# Patient Record
Sex: FEMALE | Race: WHITE | NOT HISPANIC OR LATINO | Employment: UNEMPLOYED | URBAN - METROPOLITAN AREA
[De-identification: names, ages, dates, MRNs, and addresses within clinical notes are randomized per-mention and may not be internally consistent; named-entity substitution may affect disease eponyms.]

---

## 2019-05-28 PROBLEM — N32.81 OAB (OVERACTIVE BLADDER): Status: ACTIVE | Noted: 2019-05-28

## 2019-07-13 PROBLEM — K21.9 GASTROESOPHAGEAL REFLUX DISEASE WITHOUT ESOPHAGITIS: Status: ACTIVE | Noted: 2017-05-11

## 2019-07-13 PROBLEM — F33.1 MODERATE EPISODE OF RECURRENT MAJOR DEPRESSIVE DISORDER: Status: ACTIVE | Noted: 2019-07-13

## 2019-10-25 PROBLEM — E11.9 TYPE 2 DIABETES MELLITUS WITHOUT COMPLICATION, WITHOUT LONG-TERM CURRENT USE OF INSULIN: Status: ACTIVE | Noted: 2019-10-25

## 2019-11-21 PROBLEM — M79.18 MUSCULOSKELETAL PAIN: Status: ACTIVE | Noted: 2019-11-21

## 2019-11-21 PROBLEM — J30.89 ENVIRONMENTAL AND SEASONAL ALLERGIES: Status: ACTIVE | Noted: 2019-11-21

## 2019-11-21 PROBLEM — Z79.4 TYPE 2 DIABETES MELLITUS, WITH LONG-TERM CURRENT USE OF INSULIN: Status: ACTIVE | Noted: 2019-11-21

## 2019-11-21 PROBLEM — E11.9 TYPE 2 DIABETES MELLITUS, WITH LONG-TERM CURRENT USE OF INSULIN: Status: ACTIVE | Noted: 2019-11-21

## 2019-11-21 PROBLEM — G62.9 NEUROPATHY: Status: ACTIVE | Noted: 2019-11-21

## 2019-11-26 PROBLEM — N39.46 MIXED INCONTINENCE: Status: ACTIVE | Noted: 2019-11-26

## 2019-11-26 PROBLEM — R35.0 FREQUENCY OF MICTURITION: Status: ACTIVE | Noted: 2019-11-26

## 2019-11-26 PROBLEM — R35.1 NOCTURIA: Status: ACTIVE | Noted: 2019-11-26

## 2019-11-26 PROBLEM — R39.15 URGENCY OF URINATION: Status: ACTIVE | Noted: 2019-11-26

## 2019-11-26 PROBLEM — N39.41 URGENCY INCONTINENCE: Status: RESOLVED | Noted: 2019-11-26 | Resolved: 2019-11-26

## 2019-11-26 PROBLEM — N39.41 URGENCY INCONTINENCE: Status: ACTIVE | Noted: 2019-11-26

## 2019-11-27 PROBLEM — E66.01 CLASS 2 SEVERE OBESITY WITH SERIOUS COMORBIDITY AND BODY MASS INDEX (BMI) OF 38.0 TO 38.9 IN ADULT: Status: ACTIVE | Noted: 2019-11-27

## 2019-11-27 PROBLEM — E66.812 CLASS 2 SEVERE OBESITY WITH SERIOUS COMORBIDITY AND BODY MASS INDEX (BMI) OF 38.0 TO 38.9 IN ADULT: Status: ACTIVE | Noted: 2019-11-27

## 2021-04-17 ENCOUNTER — HOSPITAL ENCOUNTER (EMERGENCY)
Facility: HOSPITAL | Age: 36
Discharge: HOME | End: 2021-04-17
Attending: EMERGENCY MEDICINE

## 2021-04-17 VITALS
WEIGHT: 235.88 LBS | HEART RATE: 87 BPM | SYSTOLIC BLOOD PRESSURE: 185 MMHG | BODY MASS INDEX: 36.95 KG/M2 | RESPIRATION RATE: 16 BRPM | TEMPERATURE: 100 F | OXYGEN SATURATION: 98 % | DIASTOLIC BLOOD PRESSURE: 97 MMHG

## 2021-04-17 DIAGNOSIS — R30.0 DYSURIA: Primary | ICD-10-CM

## 2021-04-17 DIAGNOSIS — B37.49 CANDIDIASIS, UROGENITAL: ICD-10-CM

## 2021-04-17 DIAGNOSIS — E11.9 TYPE 2 DIABETES MELLITUS WITHOUT COMPLICATION, WITHOUT LONG-TERM CURRENT USE OF INSULIN: ICD-10-CM

## 2021-04-17 LAB
B-HCG UR QL: NEGATIVE
BACTERIA #/AREA URNS AUTO: ABNORMAL /HPF
BILIRUB UR QL STRIP: NEGATIVE
CLARITY UR REFRACT.AUTO: CLEAR
COLOR UR AUTO: YELLOW
GLUCOSE UR QL STRIP: ABNORMAL
HGB UR QL STRIP: NEGATIVE
KETONES UR QL STRIP: NEGATIVE
LEUKOCYTE ESTERASE UR QL STRIP: NEGATIVE
MICROSCOPIC COMMENT: ABNORMAL
NITRITE UR QL STRIP: NEGATIVE
PH UR STRIP: 6 [PH] (ref 5–8)
PROT UR QL STRIP: NEGATIVE
SP GR UR STRIP: <=1.005 (ref 1–1.03)
SQUAMOUS #/AREA URNS AUTO: 2 /HPF
URN SPEC COLLECT METH UR: ABNORMAL
UROBILINOGEN UR STRIP-ACNC: NEGATIVE EU/DL
YEAST UR QL AUTO: ABNORMAL

## 2021-04-17 PROCEDURE — 81000 URINALYSIS NONAUTO W/SCOPE: CPT | Mod: ER | Performed by: EMERGENCY MEDICINE

## 2021-04-17 PROCEDURE — 81025 URINE PREGNANCY TEST: CPT | Mod: ER | Performed by: EMERGENCY MEDICINE

## 2021-04-17 PROCEDURE — 99284 EMERGENCY DEPT VISIT MOD MDM: CPT | Mod: ER

## 2021-04-17 RX ORDER — FLUCONAZOLE 150 MG/1
150 TABLET ORAL DAILY
Qty: 2 TABLET | Refills: 1 | Status: SHIPPED | OUTPATIENT
Start: 2021-04-17 | End: 2021-10-08 | Stop reason: ALTCHOICE

## 2021-04-17 RX ORDER — PHENAZOPYRIDINE HYDROCHLORIDE 200 MG/1
200 TABLET, FILM COATED ORAL 3 TIMES DAILY
Qty: 6 TABLET | Refills: 0 | Status: SHIPPED | OUTPATIENT
Start: 2021-04-17 | End: 2021-04-27

## 2021-04-21 DIAGNOSIS — Z11.59 NEED FOR HEPATITIS C SCREENING TEST: ICD-10-CM

## 2021-04-21 DIAGNOSIS — E11.9 TYPE 2 DIABETES MELLITUS WITHOUT COMPLICATION: ICD-10-CM

## 2022-03-04 PROBLEM — E27.8 ADRENAL MASS: Status: ACTIVE | Noted: 2022-03-04

## 2023-10-27 ENCOUNTER — PATIENT MESSAGE (OUTPATIENT)
Dept: ADMINISTRATIVE | Facility: HOSPITAL | Age: 38
End: 2023-10-27
Payer: MEDICAID

## 2023-11-06 ENCOUNTER — CLINICAL SUPPORT (OUTPATIENT)
Dept: SMOKING CESSATION | Facility: CLINIC | Age: 38
End: 2023-11-06
Payer: COMMERCIAL

## 2023-11-06 DIAGNOSIS — F17.200 SMOKER: ICD-10-CM

## 2023-11-06 PROCEDURE — 99404 PREV MED CNSL INDIV APPRX 60: CPT | Mod: S$GLB,,,

## 2023-11-06 PROCEDURE — 99404 PR PREVENT COUNSEL,INDIV,60 MIN: ICD-10-PCS | Mod: S$GLB,,,

## 2023-11-06 RX ORDER — DM/P-EPHED/ACETAMINOPH/DOXYLAM 30-7.5/3
2 LIQUID (ML) ORAL
Qty: 168 LOZENGE | Refills: 0 | Status: SHIPPED | OUTPATIENT
Start: 2023-11-06 | End: 2023-12-05

## 2023-11-06 RX ORDER — IBUPROFEN 200 MG
1 TABLET ORAL DAILY
Qty: 14 PATCH | Refills: 0 | Status: SHIPPED | OUTPATIENT
Start: 2023-11-06 | End: 2023-11-20 | Stop reason: SDUPTHER

## 2023-11-06 NOTE — PROGRESS NOTES
Pt smokes 3 cpd and vapes over 100x per day.  She smokes and vapes in her home and car.  Mom is a smoker. She has quit other addictions and wants to quit for her health.  She will use 21mg patches and 2 mg zack.

## 2023-11-13 ENCOUNTER — PATIENT MESSAGE (OUTPATIENT)
Dept: ADMINISTRATIVE | Facility: HOSPITAL | Age: 38
End: 2023-11-13
Payer: MEDICAID

## 2023-11-13 ENCOUNTER — PATIENT OUTREACH (OUTPATIENT)
Dept: ADMINISTRATIVE | Facility: HOSPITAL | Age: 38
End: 2023-11-13
Payer: MEDICAID

## 2023-11-19 PROBLEM — H52.7 REFRACTIVE ERROR: Status: ACTIVE | Noted: 2023-11-19

## 2023-11-19 PROBLEM — H35.033 HYPERTENSIVE RETINOPATHY OF BOTH EYES: Status: ACTIVE | Noted: 2023-11-19

## 2023-11-20 ENCOUNTER — CLINICAL SUPPORT (OUTPATIENT)
Dept: SMOKING CESSATION | Facility: CLINIC | Age: 38
End: 2023-11-20

## 2023-11-20 DIAGNOSIS — F17.200 NICOTINE DEPENDENCE: Primary | ICD-10-CM

## 2023-11-20 DIAGNOSIS — F17.200 SMOKER: ICD-10-CM

## 2023-11-20 PROCEDURE — 99403 PR PREVENT COUNSEL,INDIV,45 MIN: ICD-10-PCS | Mod: S$GLB,,,

## 2023-11-20 PROCEDURE — 99403 PREV MED CNSL INDIV APPRX 45: CPT | Mod: S$GLB,,,

## 2023-11-20 RX ORDER — IBUPROFEN 200 MG
1 TABLET ORAL DAILY
Qty: 14 PATCH | Refills: 0 | Status: SHIPPED | OUTPATIENT
Start: 2023-11-20 | End: 2023-11-29 | Stop reason: SDUPTHER

## 2023-11-20 RX ORDER — MICONAZOLE NITRATE 2 %
2 CREAM (GRAM) TOPICAL
Qty: 190 EACH | Refills: 0 | Status: SHIPPED | OUTPATIENT
Start: 2023-11-20

## 2023-11-20 NOTE — PROGRESS NOTES
Individual Follow-Up Form    11/20/2023    Quit Date: TBD    Clinical Status of Patient: Outpatient    Length of Service: 45 minutes    Continuing Medication: yes  Patches, Nicotine gum, or Nicotine Lozenges    Other Medications: none     Target Symptoms: Withdrawal and medication side effects. The following were  rated moderate (3) to severe (4) on TCRS:  Moderate (3): lozenges; pt macarena not like the taste or texture and declines to use them.  Severe (4): none    Comments: Pt is smoking much less on vape and has quit cigarettes.  She is using patches and no longer likes the lozenges, and has asked to use the nicotine gum instead. She is happy with progress and is hopeful about being quit soon.  She is now walking around outside of her home to help with cravings.  The patient will continue with  therapy sessions and medication monitoring by CTTS. Prescribed medication management will be by physician.  completion of TCRS (Tobacco Cessation Rating Scale) reviewed strategies, cues, and triggers. Introduced the negative impact of tobacco on health, the health advantages of discontinuing the use of tobacco, time line improved health changes after a quit, withdrawal issues to expect from nicotine and habit, and ways to achieve the goal of a quit.  The patient denies any abnormal behavioral or mental changes at this time.       Diagnosis: F17.200    Next Visit: 2 weeks

## 2023-11-29 DIAGNOSIS — F17.200 SMOKER: ICD-10-CM

## 2023-11-29 RX ORDER — IBUPROFEN 200 MG
1 TABLET ORAL DAILY
Qty: 14 PATCH | Refills: 0 | Status: SHIPPED | OUTPATIENT
Start: 2023-11-29 | End: 2023-12-21

## 2023-12-08 ENCOUNTER — CLINICAL SUPPORT (OUTPATIENT)
Dept: SMOKING CESSATION | Facility: CLINIC | Age: 38
End: 2023-12-08

## 2023-12-08 DIAGNOSIS — F17.200 NICOTINE DEPENDENCE: Primary | ICD-10-CM

## 2023-12-08 PROCEDURE — 99402 PREV MED CNSL INDIV APPRX 30: CPT | Mod: S$GLB,,,

## 2023-12-08 PROCEDURE — 99402 PR PREVENT COUNSEL,INDIV,30 MIN: ICD-10-PCS | Mod: S$GLB,,,

## 2023-12-08 RX ORDER — DIPHENHYDRAMINE HCL 25 MG
4 CAPSULE ORAL
Qty: 190 EACH | Refills: 0 | Status: SHIPPED | OUTPATIENT
Start: 2023-12-08

## 2023-12-08 RX ORDER — IBUPROFEN 200 MG
1 TABLET ORAL DAILY
Qty: 14 PATCH | Refills: 0 | Status: SHIPPED | OUTPATIENT
Start: 2023-12-08 | End: 2023-12-11 | Stop reason: SDUPTHER

## 2023-12-08 NOTE — PROGRESS NOTES
Individual Follow-Up Form    12/8/2023    Quit Date: TBD    Clinical Status of Patient: Outpatient    Length of Service: 30 minutes    Continuing Medication: yes  Patches or Nicotine gum    Other Medications: none     Target Symptoms: Withdrawal and medication side effects. The following were  rated moderate (3) to severe (4) on TCRS:  Moderate (3): none  Severe (4): none    Comments: Pt is vaping a few times per day.  She has detached more this past week from her usual vaping.  She is ready to move down in patch mg to 14 mg and she is using the nicotine gum regularly to help with cravings.   The patient will continue with  therapy sessions and medication monitoring by CTTS. Prescribed medication management will be by physician.  completion of TCRS (Tobacco Cessation Rating Scale) reviewed strategies, controlling environment, cues, triggers, new goals set. Introduced high risk situations with preparation interventions, caffeine similarities with withdrawal issues of habit and nicotine, Alcohol, Understanding urges, cravings, stress and relaxation. Open discussion with intervention discussion.  The patient denies any abnormal behavioral or mental changes at this time.        Diagnosis: F17.200    Next Visit: 2 weeks

## 2023-12-11 DIAGNOSIS — F17.200 NICOTINE DEPENDENCE: ICD-10-CM

## 2023-12-11 RX ORDER — IBUPROFEN 200 MG
1 TABLET ORAL DAILY
Qty: 14 PATCH | Refills: 0 | Status: SHIPPED | OUTPATIENT
Start: 2023-12-11 | End: 2023-12-21

## 2023-12-15 PROBLEM — F41.9 ANXIETY: Status: ACTIVE | Noted: 2023-12-15

## 2023-12-15 PROBLEM — F43.10 PTSD (POST-TRAUMATIC STRESS DISORDER): Status: ACTIVE | Noted: 2023-12-15

## 2023-12-15 PROBLEM — F15.21 METHAMPHETAMINE USE DISORDER, MODERATE, IN EARLY REMISSION: Status: ACTIVE | Noted: 2023-12-15

## 2023-12-20 ENCOUNTER — TELEPHONE (OUTPATIENT)
Dept: SMOKING CESSATION | Facility: CLINIC | Age: 38
End: 2023-12-20
Payer: MEDICAID

## 2023-12-29 PROBLEM — S90.821A BLISTER OF RIGHT FOOT: Status: ACTIVE | Noted: 2023-12-29

## 2024-01-02 ENCOUNTER — CLINICAL SUPPORT (OUTPATIENT)
Dept: SMOKING CESSATION | Facility: CLINIC | Age: 39
End: 2024-01-02

## 2024-01-02 DIAGNOSIS — F17.200 NICOTINE DEPENDENCE: ICD-10-CM

## 2024-01-02 RX ORDER — IBUPROFEN 200 MG
1 TABLET ORAL DAILY
Qty: 14 PATCH | Refills: 0 | Status: SHIPPED | OUTPATIENT
Start: 2024-01-02

## 2024-01-02 NOTE — PROGRESS NOTES
Individual Follow-Up Form    1/2/2024    Quit Date: TBD    Clinical Status of Patient: Outpatient    Length of Service: 30 minutes    Continuing Medication: yes  Patches    Other Medications: none     Target Symptoms: Withdrawal and medication side effects. The following were  rated moderate (3) to severe (4) on TCRS:  Moderate (3): none  Severe (4): none    Comments: Pt is not smoking as much due to have all of her teeth pulled for dentures.  She was advised to stop smoking, but has continued to puff on a few cigarettes here and there. I advised her on this health opportunity to completely quit smoking and she agreed and stated that she is trying to let them go. She will continue to use 14mg patches.  The patient will continue with  therapy sessions and medication monitoring by CTTS. Prescribed medication management will be by physician.  completion of TCRS (Tobacco Cessation Rating Scale) reviewed strategies, cues, triggers, high risk situations, lapses, relapses, diet, exercise, stress, relaxation, sleep, habitual behavior, and life style changes.  The patient denies any abnormal behavioral or mental changes at this time.       Diagnosis: F17.200    Next Visit: 2 weeks

## 2024-01-08 ENCOUNTER — PATIENT MESSAGE (OUTPATIENT)
Dept: ADMINISTRATIVE | Facility: HOSPITAL | Age: 39
End: 2024-01-08
Payer: MEDICAID

## 2024-01-31 ENCOUNTER — PATIENT OUTREACH (OUTPATIENT)
Dept: ADMINISTRATIVE | Facility: HOSPITAL | Age: 39
End: 2024-01-31
Payer: MEDICAID

## 2024-01-31 ENCOUNTER — PATIENT MESSAGE (OUTPATIENT)
Dept: ADMINISTRATIVE | Facility: HOSPITAL | Age: 39
End: 2024-01-31
Payer: MEDICAID

## 2024-01-31 DIAGNOSIS — E11.9 DIABETES MELLITUS TYPE 2 WITHOUT RETINOPATHY: Primary | ICD-10-CM

## 2024-02-05 ENCOUNTER — TELEPHONE (OUTPATIENT)
Dept: SMOKING CESSATION | Facility: CLINIC | Age: 39
End: 2024-02-05
Payer: MEDICAID

## 2024-04-03 ENCOUNTER — PATIENT MESSAGE (OUTPATIENT)
Dept: PULMONOLOGY | Facility: CLINIC | Age: 39
End: 2024-04-03
Payer: MEDICAID

## 2024-05-06 ENCOUNTER — TELEPHONE (OUTPATIENT)
Dept: SMOKING CESSATION | Facility: CLINIC | Age: 39
End: 2024-05-06
Payer: MEDICAID
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
## 2024-05-25 PROBLEM — S06.5XAA SUBDURAL HEMATOMA: Status: ACTIVE | Noted: 2024-05-25

## 2024-05-25 PROBLEM — S12.9XXA: Status: ACTIVE | Noted: 2024-05-25

## 2024-05-27 PROBLEM — L97.509 FOOT ULCER DUE TO SECONDARY DM: Status: ACTIVE | Noted: 2024-05-27

## 2024-05-27 PROBLEM — I10 PRIMARY HYPERTENSION: Status: ACTIVE | Noted: 2024-05-27

## 2024-05-27 PROBLEM — F10.939 ALCOHOL WITHDRAWAL SYNDROME WITH COMPLICATION: Status: ACTIVE | Noted: 2024-05-27

## 2024-05-27 PROBLEM — E11.621 TYPE 2 DIABETES MELLITUS WITH FOOT ULCER: Status: ACTIVE | Noted: 2024-05-27

## 2024-05-27 PROBLEM — L97.509 TYPE 2 DIABETES MELLITUS WITH FOOT ULCER: Status: ACTIVE | Noted: 2024-05-27

## 2024-05-27 PROBLEM — E13.621 FOOT ULCER DUE TO SECONDARY DM: Status: ACTIVE | Noted: 2024-05-27

## 2024-05-27 PROBLEM — F19.939 DRUG WITHDRAWAL: Status: ACTIVE | Noted: 2024-05-27
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
## 2024-06-02 PROBLEM — R31.9 URINARY TRACT INFECTION WITH HEMATURIA: Status: ACTIVE | Noted: 2024-06-02

## 2024-06-02 PROBLEM — N39.0 URINARY TRACT INFECTION WITH HEMATURIA: Status: ACTIVE | Noted: 2024-06-02

## 2024-09-02 PROBLEM — R31.9 URINARY TRACT INFECTION WITH HEMATURIA: Status: RESOLVED | Noted: 2024-06-02 | Resolved: 2024-09-02

## 2024-09-02 PROBLEM — N39.0 URINARY TRACT INFECTION WITH HEMATURIA: Status: RESOLVED | Noted: 2024-06-02 | Resolved: 2024-09-02

## 2024-09-23 ENCOUNTER — PATIENT OUTREACH (OUTPATIENT)
Dept: ADMINISTRATIVE | Facility: HOSPITAL | Age: 39
End: 2024-09-23
Payer: COMMERCIAL

## 2024-09-23 DIAGNOSIS — E11.9 DIABETES MELLITUS TYPE 2 WITHOUT RETINOPATHY: Primary | ICD-10-CM

## 2024-11-13 ENCOUNTER — TELEPHONE (OUTPATIENT)
Dept: SMOKING CESSATION | Facility: CLINIC | Age: 39
End: 2024-11-13
Payer: COMMERCIAL

## 2024-11-13 NOTE — TELEPHONE ENCOUNTER
1st attempt, regarding smoking cessation 12 month follow up for quit 1 episode. Voicemail box not set up; unable to leave a message.

## 2024-11-20 ENCOUNTER — TELEPHONE (OUTPATIENT)
Dept: SMOKING CESSATION | Facility: CLINIC | Age: 39
End: 2024-11-20
Payer: COMMERCIAL

## 2024-11-20 NOTE — TELEPHONE ENCOUNTER
2nd attempt, regarding smoking cessation 12 month follow up on quit 1 episode. Voicemail box not set up. Unable to leave a message or reach patient. Will resolve episode.

## 2025-01-09 ENCOUNTER — ON-DEMAND VIRTUAL (OUTPATIENT)
Dept: URGENT CARE | Facility: CLINIC | Age: 40
End: 2025-01-09
Payer: MEDICAID

## 2025-01-09 DIAGNOSIS — G62.9 NEUROPATHY: Primary | ICD-10-CM

## 2025-01-09 NOTE — PROGRESS NOTES
Subjective:      Patient ID: Amber Kay Barran-Sturgeon is a 39 y.o. female.    Vitals:  vitals were not taken for this visit.     Chief Complaint: No chief complaint on file.      Visit Type: TELE AUDIOVISUAL    Present with the patient at the time of consultation: TELEMED PRESENT WITH PATIENT: colleague/friend in vehicle.     Past Medical History:   Diagnosis Date    Anxiety     Depression     Diabetes mellitus     Diabetes mellitus, type 2     Frequency of micturition 2019    GERD (gastroesophageal reflux disease)     Hyperlipidemia     Hypertension     Insomnia     Methamphetamine use disorder, moderate, in early remission 12/15/2023    Nocturia 2019    Sleep apnea     Stress incontinence     Urgency incontinence 2019    Urgency of urination 2019     Past Surgical History:   Procedure Laterality Date    BIOPSY OF AXILLARY NODE Left 3/4/2022    Procedure: BIOPSY, LYMPH NODE, AXILLARY;  Surgeon: Walt Truong MD;  Location: [facility] OR;  Service: General;  Laterality: Left;    elective       LIVER BIOPSY      ROBOT-ASSISTED SURGICAL REMOVAL OF ADRENAL GLAND USING DA CHAR XI Left 3/14/2022    Procedure: XI ROBOTIC ADRENALECTOMY;  Surgeon: Walt Truong MD;  Location: [facility] OR;  Service: General;  Laterality: Left;    WISDOM TOOTH EXTRACTION       Review of patient's allergies indicates:   Allergen Reactions    Propoxyphene n-acetaminophen Itching    Vancomycin analogues Swelling and Blisters    Codeine     Hydrocodone     Hydrocodone bitartrate      Current Outpatient Medications on File Prior to Visit   Medication Sig Dispense Refill    ARIPiprazole (ABILIFY) 5 MG Tab Take 1 tablet by mouth once daily 30 tablet 5    atorvastatin (LIPITOR) 20 MG tablet Take 1 tablet by mouth once daily 30 tablet 5    blood sugar diagnostic (TRUE METRIX GLUCOSE TEST STRIP) Strp Use one daily 100 strip 3    blood-glucose meter kit Use as instructed 1 each 0    carvediloL (COREG) 12.5  MG tablet Take 1 tablet (12.5 mg total) by mouth 2 (two) times daily with meals. 60 tablet 5    cetirizine (ZYRTEC) 10 MG tablet Take 1 tablet (10 mg total) by mouth once daily. 30 tablet 5    chlorhexidine (PERIDEX) 0.12 % solution swish and spit 15 mls BY MOUTH ONCE DAILY AS DIRECTED      cyclobenzaprine (FLEXERIL) 5 MG tablet Take 5 mg by mouth 3 (three) times daily as needed.      dicyclomine (BENTYL) 10 MG capsule Take 1 capsule (10 mg total) by mouth 3 (three) times daily. 90 capsule 0    DULoxetine (CYMBALTA) 60 MG capsule Take 1 capsule (60 mg total) by mouth 2 (two) times daily. 60 capsule 1    ferrous gluconate 324 mg (37.5 mg iron) Tab tablet Take 1 tablet (324 mg total) by mouth daily with breakfast. 90 tablet 3    fluticasone propionate (FLONASE) 50 mcg/actuation nasal spray 1 spray by Each Nostril route.      glimepiride (AMARYL) 4 MG tablet TAKE 1 TABLET BY MOUTH BEFORE BREAKFAST 30 tablet 5    glipiZIDE (GLUCOTROL) 10 MG tablet Take 1 tablet (10 mg total) by mouth 2 (two) times daily before meals. 30 tablet 5    ibuprofen (ADVIL,MOTRIN) 600 MG tablet Take 1 tablet (600 mg total) by mouth 2 (two) times a day. 60 tablet 5    insulin aspart U-100 (NOVOLOG) 100 unit/mL (3 mL) InPn pen Inject 12 Units into the skin 3 (three) times daily with meals. 9 mL 5    insulin glargine U-100, Lantus, (LANTUS SOLOSTAR U-100 INSULIN) 100 unit/mL (3 mL) InPn pen Inject 80 Units into the skin once daily. 24 mL 5    levETIRAcetam (KEPPRA) 1000 MG tablet Take 1 tablet (1,000 mg total) by mouth 2 (two) times daily. 60 tablet 5    lisinopriL 10 MG tablet Take 1 tablet (10 mg total) by mouth once daily. 30 tablet 5    metFORMIN (GLUCOPHAGE-XR) 500 MG ER 24hr tablet Take 2 tablets (1,000 mg total) by mouth 2 (two) times daily with meals. 120 tablet 5    mupirocin (BACTROBAN) 2 % ointment SMARTSIG:Sparingly Topical Daily      nicotine (NICODERM CQ) 14 mg/24 hr Place 1 patch onto the skin once daily. 14 patch 0    nicotine  "polacrilex (NICORETTE) 4 MG Gum Take 1 each (4 mg total) by mouth as needed (May use up to 6 pieces in 24 hours). 190 each 0    nicotine, polacrilex, (NICORETTE) 2 mg Gum Take 1 each (2 mg total) by mouth as needed (May use up to 8 pieces of gum in 24 horus). 190 each 0    NIFEdipine (PROCARDIA-XL) 90 MG (OSM) 24 hr tablet Take 1 tablet (90 mg total) by mouth once daily. 30 tablet 11    norethindrone (MICRONOR) 0.35 mg tablet Take 1 tablet (0.35 mg total) by mouth once daily. 28 tablet 11    omeprazole (PRILOSEC) 20 MG capsule Take 1 capsule (20 mg total) by mouth 2 (two) times daily as needed (gerd). Take 1 capsule (20 mg total) by mouth daily 60 capsule 5    oxybutynin (DITROPAN) 5 MG Tab Take 5 mg by mouth 3 (three) times daily.      pen needle, diabetic (PEN NEEDLE) 31 gauge x 1/4" Ndle 4 injection a day 120 each 5    pen needle, diabetic 31 gauge x 3/16" Ndle Use 1 daily 100 each 3    pen needle, diabetic 31 gauge x 5/16" Ndle USE 1 NEEDLE ONCE DAILY      pregabalin (LYRICA) 300 MG Cap Take 1 capsule (300 mg total) by mouth 3 (three) times daily. 90 capsule 5    QUEtiapine (SEROQUEL) 50 MG tablet Take 1 tablet (50 mg total) by mouth every evening. 30 tablet 1    semaglutide (OZEMPIC) 0.25 mg or 0.5 mg (2 mg/3 mL) pen injector Inject 0.5 mg into the skin every 7 days. 1 each 2    semaglutide (OZEMPIC) 1 mg/dose (4 mg/3 mL) Inject 1 mg into the skin every 7 days. 3 mL 0    semaglutide (OZEMPIC) 2 mg/dose (8 mg/3 mL) PnIj Inject 2 mg into the skin every 7 days. 3 mL 5    traMADoL (ULTRAM) 50 mg tablet Take 1 tablet (50 mg total) by mouth every 6 (six) hours. 30 tablet 1    traMADoL (ULTRAM) 50 mg tablet Take 1 tablet (50 mg total) by mouth every 6 (six) hours as needed for Pain. 20 tablet 0    traMADoL (ULTRAM) 50 mg tablet Take 1 tablet (50 mg total) by mouth every 6 (six) hours. 30 tablet 0     Current Facility-Administered Medications on File Prior to Visit   Medication Dose Route Frequency Provider Last Rate " Last Admin    glycopyrrolate (PF) injection 0.8 mg  0.8 mg Intravenous 1 time in Clinic/HOD Jaz Arora DDS, MD        prochlorperazine injection Soln 5 mg  5 mg Intramuscular Once PRN Jaz Arora DDS, MD         Family History   Problem Relation Name Age of Onset    Diabetes Father      Hypertension Father      Diabetes Mother      Hypertension Mother      Cancer Paternal Grandfather      Breast cancer Neg Hx      Uterine cancer Neg Hx      Ovarian cancer Neg Hx      Pancreatic cancer Neg Hx      Cervical cancer Neg Hx      Colon cancer Neg Hx             Ohs Peq Odvv Intake    1/9/2025  3:06 PM CST - Filed by Patient   What is your current physical address in the event of a medical emergency? [address] [place] [address]   Are you able to take your vital signs? No   Please attach any relevant images or files    Is your employer contracted with [facility]? No         Patient with hx of DM, out of meds for months wants refill of gabapentin for neuropathy. Does not want insulin even though she is out. Has not seen any provider in over 6 mos.         Constitution: Negative for fever.   Neurological:  Positive for numbness and tingling.        Objective:   The physical exam was conducted virtually.  Physical Exam   Neurological: She is alert.   Psychiatric: She experiences Normal attention and Normal perception. Her mood appears anxious.       Assessment:     1. Neuropathy        Plan:       Neuropathy      Because you have been out of medication for some time I advise you go to the ER to have your diabetes controlled. I will not send in gabapentin at this time when your diabetes is not being taken care of.

## 2025-01-09 NOTE — PATIENT INSTRUCTIONS
Because you have been out of medication for some time I advise you go to the ER to have your diabetes controlled. I will not send in gabapentin at this time when your diabetes is not being taken care of.     Given history and symptoms further in-person evaluation is needed for a diagnosis and treatment plan.    Patient encouraged to monitor symptoms closely and instructed to follow-up for new or worsening symptoms. Further, in-person, evaluation is necessary for continued treatment. Please follow up in Urgent Care, ER, or  with your primary care doctor or specialist as needed. Verbally discussed plan. Patient confirms understanding and is in agreement with treatment and plan.     You must understand that you've received a Virtual Care evaluation only and that you may be released before all your medical problems are known or treated. You, the patient, will arrange for follow-up care as instructed.